# Patient Record
Sex: FEMALE | Race: WHITE | NOT HISPANIC OR LATINO | Employment: UNEMPLOYED | URBAN - METROPOLITAN AREA
[De-identification: names, ages, dates, MRNs, and addresses within clinical notes are randomized per-mention and may not be internally consistent; named-entity substitution may affect disease eponyms.]

---

## 2018-08-29 ENCOUNTER — OFFICE VISIT (OUTPATIENT)
Dept: URGENT CARE | Facility: CLINIC | Age: 43
End: 2018-08-29
Payer: MEDICAID

## 2018-08-29 VITALS
HEART RATE: 103 BPM | OXYGEN SATURATION: 99 % | BODY MASS INDEX: 33.34 KG/M2 | HEIGHT: 68 IN | SYSTOLIC BLOOD PRESSURE: 136 MMHG | RESPIRATION RATE: 18 BRPM | DIASTOLIC BLOOD PRESSURE: 86 MMHG | WEIGHT: 220 LBS | TEMPERATURE: 99 F

## 2018-08-29 DIAGNOSIS — K29.00 ACUTE SUPERFICIAL GASTRITIS WITHOUT HEMORRHAGE: Primary | ICD-10-CM

## 2018-08-29 PROCEDURE — 99203 OFFICE O/P NEW LOW 30 MIN: CPT | Mod: S$GLB,,, | Performed by: FAMILY MEDICINE

## 2018-08-29 RX ORDER — ESOMEPRAZOLE MAGNESIUM 20 MG/1
20 GRANULE, DELAYED RELEASE ORAL
Qty: 30 EACH | Refills: 0 | Status: SHIPPED | OUTPATIENT
Start: 2018-08-29 | End: 2018-09-03 | Stop reason: ALTCHOICE

## 2018-08-29 NOTE — PROGRESS NOTES
"Subjective:       Patient ID: Samia Marinelli is a 43 y.o. female.    Vitals:  height is 5' 8" (1.727 m) and weight is 99.8 kg (220 lb). Her oral temperature is 98.7 °F (37.1 °C). Her blood pressure is 136/86 and her pulse is 103. Her respiration is 18 and oxygen saturation is 99%.     Chief Complaint: Diarrhea and Headache    Patient complains of diarrhea, headache, and gas. Symptoms started around Saturday night. Patient denies running any fever. Denies nausea or vomiting. Denies any specific stomach pain, other than gas cramps.      Diarrhea    This is a new problem. The current episode started in the past 7 days. The problem occurs 2 to 4 times per day. The problem has been unchanged. The patient states that diarrhea does not awaken her from sleep. Associated symptoms include headaches. Pertinent negatives include no abdominal pain, bloating, chills, fever or vomiting.   Headache    This is a new problem. The problem occurs intermittently. The pain is located in the frontal region. The pain quality is similar to prior headaches. The quality of the pain is described as aching. Pertinent negatives include no abdominal pain, back pain, fever, nausea or vomiting. She has tried nothing for the symptoms.     Review of Systems   Constitution: Negative for chills and fever.   Cardiovascular: Negative for chest pain.   Respiratory: Negative for shortness of breath.    Musculoskeletal: Negative for back pain.   Gastrointestinal: Positive for diarrhea. Negative for bloating, abdominal pain, constipation, hematochezia, melena, nausea and vomiting.   Genitourinary: Negative for dysuria.   Neurological: Positive for headaches.       Objective:      Physical Exam   Constitutional: She is oriented to person, place, and time. She appears well-developed and well-nourished. No distress.   HENT:   Head: Normocephalic and atraumatic.   Right Ear: External ear normal.   Left Ear: External ear normal.   Mouth/Throat: Oropharynx is clear " and moist. No oropharyngeal exudate.   Eyes: EOM are normal. Pupils are equal, round, and reactive to light.   Fundi without papilledema or hemorrhage   Neck: Normal range of motion. Neck supple.   Cardiovascular: Normal rate and regular rhythm.   Pulmonary/Chest: Breath sounds normal.   Abdominal: Soft. Bowel sounds are normal. She exhibits no distension and no mass. There is no tenderness. There is no guarding.   Musculoskeletal: Normal range of motion. She exhibits no edema or deformity.   Lymphadenopathy:     She has no cervical adenopathy.   Neurological: She is alert and oriented to person, place, and time.   Skin: Skin is warm and dry.       Assessment:       1. Acute superficial gastritis without hemorrhage        Plan:         Acute superficial gastritis without hemorrhage    Other orders  -     esomeprazole (NEXIUM) 20 mg GrPS; Take 20 mg by mouth before breakfast.  Dispense: 30 each; Refill: 0     Tylenol for headache.  Follow-up as needed.

## 2018-08-29 NOTE — LETTER
August 29, 2018      [facility] [address], [address]  [facility] [address]  Phone: [phone]  Fax: [phone]       Patient: Samia Marinelli   YOB: 1975  Date of Visit: 08/29/2018    To Whom It May Concern:    Jazz Marinelli  was at [facility] on 08/29/2018. She may return to work on 8/30/18 with no restrictions. If you have any questions or concerns, or if I can be of further assistance, please do not hesitate to contact me.    Sincerely,    Karen Evans MA

## 2018-08-29 NOTE — PATIENT INSTRUCTIONS
Gastritis (Adult)    Gastritis is inflammation and irritation of the stomach lining. It can be present for a short time (acute) or be long lasting (chronic). Gastritis is often caused by infection with bacteria called H pylori. More than a third of people in the [place] have this bacteria in their bodies. In many cases, H pylori causes no problems or symptoms. In some people, though, the infection irritates the stomach lining and causes gastritis. Other causes of stomach irritation include drinking alcohol or taking pain-relieving medicines called NSAIDs (such as aspirin or ibuprofen).   Symptoms of gastritis can include:  · Abdominal pain or bloating  · Loss of appetite  · Nausea or vomiting  · Vomiting blood or having black stools  · Feeling more tired than usual  An inflamed and irritated stomach lining is more likely to develop a sore called an ulcer. To help prevent this, gastritis should be treated.  Home care  If needed, medicines may be prescribed. If you have H pylori infection, treating it will likely relieve your symptoms. Other changes can help reduce stomach irritation and help it heal.  · If you have been prescribed medicines for H pylori infection, take them as directed. Take all of the medicine until it is finished or your healthcare provider tells you to stop, even if you feel better.  · Your healthcare provider may recommend avoiding NSAIDs. If you take daily aspirin for your heart or other medical reasons, do not stop without talking to your healthcare provider first.  · Avoid drinking alcohol.  · Stop smoking. Smoking can irritate the stomach and delay healing. As much as possible, stay away from second hand smoke.  Follow-up care  Follow up with your healthcare provider, or as advised by our staff. Testing may be needed to check for inflammation or an ulcer.  When to seek medical advice  Call your healthcare provider for any of the following:  · Stomach pain that gets worse or moves to the lower  right abdomen (appendix area)  · Chest pain that appears or gets worse, or spreads to the back, neck, shoulder, or arm  · Frequent vomiting (cant keep down liquids)  · Blood in the stool or vomit (red or black in color)  · Feeling weak or dizzy  · Fever of 100.4ºF (38ºC) or higher, or as directed by your healthcare provider  Date Last Reviewed: 6/22/2015  © 3704-5249 [employer]. [address] [place] [address]. All rights reserved. This information is not intended as a substitute for professional medical care. Always follow your healthcare professional's instructions.

## 2018-09-03 ENCOUNTER — OFFICE VISIT (OUTPATIENT)
Dept: URGENT CARE | Facility: CLINIC | Age: 43
End: 2018-09-03
Payer: MEDICAID

## 2018-09-03 VITALS
OXYGEN SATURATION: 99 % | WEIGHT: 220 LBS | HEIGHT: 68 IN | SYSTOLIC BLOOD PRESSURE: 132 MMHG | BODY MASS INDEX: 33.34 KG/M2 | DIASTOLIC BLOOD PRESSURE: 92 MMHG | HEART RATE: 92 BPM | TEMPERATURE: 99 F

## 2018-09-03 DIAGNOSIS — K29.00 ACUTE SUPERFICIAL GASTRITIS WITHOUT HEMORRHAGE: Primary | ICD-10-CM

## 2018-09-03 DIAGNOSIS — E66.9 OBESITY, UNSPECIFIED CLASSIFICATION, UNSPECIFIED OBESITY TYPE, UNSPECIFIED WHETHER SERIOUS COMORBIDITY PRESENT: ICD-10-CM

## 2018-09-03 PROCEDURE — 99214 OFFICE O/P EST MOD 30 MIN: CPT | Mod: S$GLB,,, | Performed by: PHYSICIAN ASSISTANT

## 2018-09-03 RX ORDER — OMEPRAZOLE 40 MG/1
40 CAPSULE, DELAYED RELEASE ORAL DAILY
Qty: 30 CAPSULE | Refills: 1 | Status: SHIPPED | OUTPATIENT
Start: 2018-09-03 | End: 2019-09-03

## 2018-09-03 RX ORDER — ONDANSETRON HYDROCHLORIDE 8 MG/1
8 TABLET, FILM COATED ORAL EVERY 8 HOURS PRN
Qty: 30 TABLET | Refills: 3 | Status: SHIPPED | OUTPATIENT
Start: 2018-09-03 | End: 2018-09-13

## 2018-09-03 NOTE — LETTER
September 3, 2018      [facility] [address], [address]  [facility] [address]  Phone: [phone]  Fax: [phone]       Patient: Samia Marinelli   YOB: 1975  Date of Visit: 09/03/2018    To Whom It May Concern:    Jazz Marinelli  was at [facility] on 09/03/2018. She may return to work/school on 09/04/2018 with no restrictions. If you have any questions or concerns, or if I can be of further assistance, please do not hesitate to contact me.    Sincerely,    Nusrat Quijano, RT

## 2018-09-03 NOTE — PROGRESS NOTES
"Subjective:       Patient ID: Samia Marinelli is a 43 y.o. female.    Vitals:  height is 5' 8" (1.727 m) and weight is 99.8 kg (220 lb). Her oral temperature is 99.2 °F (37.3 °C). Her blood pressure is 132/92 (abnormal) and her pulse is 92. Her oxygen saturation is 99%.     Chief Complaint: Abdominal Pain    Pt Presents with stomach burning pain.  She states that 30 minutes after a meal she has diarrhea.    The patient was here last week and was diagnosed with gastritis.  She has not taken any prescription or over-the-counter medicines other than the Nexium that was given to her last week.  She states that her diet consists mainly of fast food, pastas, and other processed foods.  She works at [employer]. drinks sweet tea, lemonade and milk.      GI Problem   The primary symptoms include abdominal pain and diarrhea (and burning sensation). Primary symptoms do not include fever, nausea, vomiting, melena, hematochezia or dysuria. The illness began more than 7 days ago. The problem has not changed since onset.  The illness does not include chills, constipation or back pain. Significant associated medical issues include GERD.     Review of Systems   Constitution: Negative for chills and fever.   Cardiovascular: Negative for chest pain.   Respiratory: Negative for shortness of breath.    Musculoskeletal: Negative for back pain.   Gastrointestinal: Positive for abdominal pain and diarrhea (and burning sensation). Negative for constipation, hematochezia, melena, nausea and vomiting.   Genitourinary: Negative for dysuria.       Objective:      Physical Exam   Constitutional: She is oriented to person, place, and time. She appears well-developed and well-nourished.   HENT:   Head: Normocephalic and atraumatic.   Right Ear: External ear normal.   Left Ear: External ear normal.   Nose: Nose normal.   Mouth/Throat: Mucous membranes are normal.   Eyes: Conjunctivae and lids are normal.   Neck: Trachea normal and full passive range " of motion without pain. Neck supple.   Cardiovascular: Normal rate, regular rhythm, S2 normal and normal heart sounds.   Pulmonary/Chest: Effort normal and breath sounds normal. No respiratory distress. She has no decreased breath sounds. She has no wheezes.   Abdominal: Soft. Normal appearance and bowel sounds are normal. She exhibits no distension, no abdominal bruit, no pulsatile midline mass and no mass. There is tenderness in the epigastric area. There is no rigidity, no rebound, no guarding, no CVA tenderness, no tenderness at McBurney's point and negative Bustos's sign.   Musculoskeletal: Normal range of motion. She exhibits no edema.   Neurological: She is alert and oriented to person, place, and time. She has normal strength.   Skin: Skin is warm, dry and intact. She is not diaphoretic. No pallor.   Psychiatric: She has a normal mood and affect. Her speech is normal and behavior is normal. Judgment and thought content normal. Cognition and memory are normal.   Nursing note and vitals reviewed.      Assessment:       1. Acute superficial gastritis without hemorrhage    2. Obesity, unspecified classification, unspecified obesity type, unspecified whether serious comorbidity present        Plan:         Acute superficial gastritis without hemorrhage  -     ranitidine (ZANTAC) 300 MG tablet; Take 1 tablet (300 mg total) by mouth nightly.  Dispense: 30 tablet; Refill: 1  -     omeprazole (PRILOSEC) 40 MG capsule; Take 1 capsule (40 mg total) by mouth once daily.  Dispense: 30 capsule; Refill: 1  -     ondansetron (ZOFRAN) 8 MG tablet; Take 1 tablet (8 mg total) by mouth every 8 (eight) hours as needed for Nausea.  Dispense: 30 tablet; Refill: 3    Obesity, unspecified classification, unspecified obesity type, unspecified whether serious comorbidity present     We discussed her symptoms and her treatment failure.  We discussed at length her diet and reasons for change and adjusting her food intake.  She  verbalized understanding.    Patient Instructions       Healthier Fast Food Choices  These days its hard to go anywhere without spotting a fast food restaurant nearby. These restaurants offer simple and inexpensive meal options when you cant cook or eat at home. But there are some drawbacks to this convenience. Studies show that the more often you eat out and choose fast foods, the more likely you are to gain weight or become obese. This can lead to increased health risks over time. Does this mean you have to give up eating fast food? No. But you do need to start making healthier fast food choices. This includes choosing more nutrition-packed items and eating smaller portion sizes.  Fast food trade-offs  To begin with, dont get into a rut when you order. Choose healthier options over less healthy foods. Try the following trade-offs.  Instead of ordering a: Choose one of these:   Hamburger Grilled chicken or turkey sandwich   Side of fries Baked potato, cup of soup, or small salad with dressing on the side   Soda or milkshake Carton of fat-free or low-fat milk, water, unsweetened tea, sparkling water, or other drinks with no added sugars   Burrito or taco A lean-meat or all-vegetable sandwich wrap      Tips for Healthier Eating at Fast Food Restaurants  Fast food isn't limited to just whats on the menu. When you place an order, ask if you can make special requests. Most places will be glad to work with you.  · Choose the medium- or small-sized options when you order main dishes, sides, and beverages. If an adult meal is too large for you, try a kids' meal. Or split a meal with a friend.  · Order your burger on whole-wheat buns or your sandwich on whole-wheat bread, if possible.  · Dont add salt to your meal. Fast foods tend to be higher in salt. Too much salt raises your risk for high blood pressure and heart disease.  · Avoid mayonnaise, sour cream, or special sauces. Ask for ketchup and mustard on the  side.  · Avoid high-fat sides. If you dont want the fries or chips that come with your meal, ask that they not be included. If you can substitute something else, ask for extra lettuce and tomato instead.  · Choose fresh fruit or yogurt for dessert. Avoid ice cream, pies, and pastries. These are more likely to be high in fat and sugar.  · Dont overeat. If youre full, ask for a takeout box and save the leftovers for later.  · Put leftovers in the refrigerator as soon as you can. Any food left at room temperature for over 2 hours should be thrown out.  Date Last Reviewed: 6/8/2015 © 2000-2017 [employer]. [address] [place] [address], [place] [address]. All rights reserved. This information is not intended as a substitute for professional medical care. Always follow your healthcare professional's instructions.        Gastritis (Adult)    Gastritis is inflammation and irritation of the stomach lining. It can be present for a short time (acute) or be long lasting (chronic). Gastritis is often caused by infection with bacteria called H pylori. More than a third of people in the [place] have this bacteria in their bodies. In many cases, H pylori causes no problems or symptoms. In some people, though, the infection irritates the stomach lining and causes gastritis. Other causes of stomach irritation include drinking alcohol or taking pain-relieving medicines called NSAIDs (such as aspirin or ibuprofen).   Symptoms of gastritis can include:  · Abdominal pain or bloating  · Loss of appetite  · Nausea or vomiting  · Vomiting blood or having black stools  · Feeling more tired than usual  An inflamed and irritated stomach lining is more likely to develop a sore called an ulcer. To help prevent this, gastritis should be treated.  Home care  If needed, medicines may be prescribed. If you have H pylori infection, treating it will likely relieve your symptoms. Other changes can help reduce stomach irritation and help it  heal.  · If you have been prescribed medicines for H pylori infection, take them as directed. Take all of the medicine until it is finished or your healthcare provider tells you to stop, even if you feel better.  · Your healthcare provider may recommend avoiding NSAIDs. If you take daily aspirin for your heart or other medical reasons, do not stop without talking to your healthcare provider first.  · Avoid drinking alcohol.  · Stop smoking. Smoking can irritate the stomach and delay healing. As much as possible, stay away from second hand smoke.  Follow-up care  Follow up with your healthcare provider, or as advised by our staff. Testing may be needed to check for inflammation or an ulcer.  When to seek medical advice  Call your healthcare provider for any of the following:  · Stomach pain that gets worse or moves to the lower right abdomen (appendix area)  · Chest pain that appears or gets worse, or spreads to the back, neck, shoulder, or arm  · Frequent vomiting (cant keep down liquids)  · Blood in the stool or vomit (red or black in color)  · Feeling weak or dizzy  · Fever of 100.4ºF (38ºC) or higher, or as directed by your healthcare provider  Date Last Reviewed: 6/22/2015  © 9157-8939 [employer]. [address] [place] [address], [place] [address]. All rights reserved. This information is not intended as a substitute for professional medical care. Always follow your healthcare professional's instructions.    If you were prescribed a narcotic medication, do not drive or operate heavy equipment or machinery while taking these medications.  You must understand that you've received an Urgent Care treatment only and that you may be released before all your medical problems are known or treated. You, the patient, will arrange for follow up care as instructed.  Follow up with your PCP or specialty clinic as directed in the next 1-2 weeks if not improved or as needed.  You can call [phone] to schedule an  appointment with the appropriate provider.  If your condition worsens we recommend that you receive another evaluation at the emergency room immediately or contact your primary medical clinics after hours call service to discuss your concerns.  Please return here or go to the Emergency Department for any concerns or worsening of condition.

## 2018-09-03 NOTE — PATIENT INSTRUCTIONS
Healthier Fast Food Choices  These days its hard to go anywhere without spotting a fast food restaurant nearby. These restaurants offer simple and inexpensive meal options when you cant cook or eat at home. But there are some drawbacks to this convenience. Studies show that the more often you eat out and choose fast foods, the more likely you are to gain weight or become obese. This can lead to increased health risks over time. Does this mean you have to give up eating fast food? No. But you do need to start making healthier fast food choices. This includes choosing more nutrition-packed items and eating smaller portion sizes.  Fast food trade-offs  To begin with, dont get into a rut when you order. Choose healthier options over less healthy foods. Try the following trade-offs.  Instead of ordering a: Choose one of these:   Hamburger Grilled chicken or turkey sandwich   Side of fries Baked potato, cup of soup, or small salad with dressing on the side   Soda or milkshake Carton of fat-free or low-fat milk, water, unsweetened tea, sparkling water, or other drinks with no added sugars   Burrito or taco A lean-meat or all-vegetable sandwich wrap      Tips for Healthier Eating at Fast Food Restaurants  Fast food isn't limited to just whats on the menu. When you place an order, ask if you can make special requests. Most places will be glad to work with you.  · Choose the medium- or small-sized options when you order main dishes, sides, and beverages. If an adult meal is too large for you, try a kids' meal. Or split a meal with a friend.  · Order your burger on whole-wheat buns or your sandwich on whole-wheat bread, if possible.  · Dont add salt to your meal. Fast foods tend to be higher in salt. Too much salt raises your risk for high blood pressure and heart disease.  · Avoid mayonnaise, sour cream, or special sauces. Ask for ketchup and mustard on the side.  · Avoid high-fat sides. If you dont want the fries or  chips that come with your meal, ask that they not be included. If you can substitute something else, ask for extra lettuce and tomato instead.  · Choose fresh fruit or yogurt for dessert. Avoid ice cream, pies, and pastries. These are more likely to be high in fat and sugar.  · Dont overeat. If youre full, ask for a takeout box and save the leftovers for later.  · Put leftovers in the refrigerator as soon as you can. Any food left at room temperature for over 2 hours should be thrown out.  Date Last Reviewed: 6/8/2015  © 6071-6451 [employer]. [address] [place] [address], [place] [address]. All rights reserved. This information is not intended as a substitute for professional medical care. Always follow your healthcare professional's instructions.        Gastritis (Adult)    Gastritis is inflammation and irritation of the stomach lining. It can be present for a short time (acute) or be long lasting (chronic). Gastritis is often caused by infection with bacteria called H pylori. More than a third of people in the  have this bacteria in their bodies. In many cases, H pylori causes no problems or symptoms. In some people, though, the infection irritates the stomach lining and causes gastritis. Other causes of stomach irritation include drinking alcohol or taking pain-relieving medicines called NSAIDs (such as aspirin or ibuprofen).   Symptoms of gastritis can include:  · Abdominal pain or bloating  · Loss of appetite  · Nausea or vomiting  · Vomiting blood or having black stools  · Feeling more tired than usual  An inflamed and irritated stomach lining is more likely to develop a sore called an ulcer. To help prevent this, gastritis should be treated.  Home care  If needed, medicines may be prescribed. If you have H pylori infection, treating it will likely relieve your symptoms. Other changes can help reduce stomach irritation and help it heal.  · If you have been prescribed medicines for H  pylori infection, take them as directed. Take all of the medicine until it is finished or your healthcare provider tells you to stop, even if you feel better.  · Your healthcare provider may recommend avoiding NSAIDs. If you take daily aspirin for your heart or other medical reasons, do not stop without talking to your healthcare provider first.  · Avoid drinking alcohol.  · Stop smoking. Smoking can irritate the stomach and delay healing. As much as possible, stay away from second hand smoke.  Follow-up care  Follow up with your healthcare provider, or as advised by our staff. Testing may be needed to check for inflammation or an ulcer.  When to seek medical advice  Call your healthcare provider for any of the following:  · Stomach pain that gets worse or moves to the lower right abdomen (appendix area)  · Chest pain that appears or gets worse, or spreads to the back, neck, shoulder, or arm  · Frequent vomiting (cant keep down liquids)  · Blood in the stool or vomit (red or black in color)  · Feeling weak or dizzy  · Fever of 100.4ºF (38ºC) or higher, or as directed by your healthcare provider  Date Last Reviewed: 6/22/2015  © 1594-2045 [employer]. [address] [place] [address]. All rights reserved. This information is not intended as a substitute for professional medical care. Always follow your healthcare professional's instructions.    If you were prescribed a narcotic medication, do not drive or operate heavy equipment or machinery while taking these medications.  You must understand that you've received an Urgent Care treatment only and that you may be released before all your medical problems are known or treated. You, the patient, will arrange for follow up care as instructed.  Follow up with your PCP or specialty clinic as directed in the next 1-2 weeks if not improved or as needed.  You can call [phone] to schedule an appointment with the appropriate provider.  If your  condition worsens we recommend that you receive another evaluation at the emergency room immediately or contact your primary medical clinics after hours call service to discuss your concerns.  Please return here or go to the Emergency Department for any concerns or worsening of condition.

## 2018-09-06 ENCOUNTER — TELEPHONE (OUTPATIENT)
Dept: URGENT CARE | Facility: CLINIC | Age: 43
End: 2018-09-06